# Patient Record
Sex: FEMALE | Race: WHITE | Employment: STUDENT | URBAN - METROPOLITAN AREA
[De-identification: names, ages, dates, MRNs, and addresses within clinical notes are randomized per-mention and may not be internally consistent; named-entity substitution may affect disease eponyms.]

---
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
## 2019-02-19 ENCOUNTER — OFFICE VISIT (OUTPATIENT)
Dept: FAMILY MEDICINE CLINIC | Age: 19
End: 2019-02-19
Payer: COMMERCIAL

## 2019-02-19 VITALS
DIASTOLIC BLOOD PRESSURE: 67 MMHG | WEIGHT: 194 LBS | OXYGEN SATURATION: 96 % | HEART RATE: 67 BPM | SYSTOLIC BLOOD PRESSURE: 103 MMHG | HEIGHT: 66 IN | TEMPERATURE: 98.1 F | BODY MASS INDEX: 31.18 KG/M2 | RESPIRATION RATE: 16 BRPM

## 2019-02-19 DIAGNOSIS — Z00.00 ENCOUNTER FOR WELLNESS EXAMINATION IN ADULT: Primary | ICD-10-CM

## 2019-02-19 PROCEDURE — 86580 TB INTRADERMAL TEST: CPT | Performed by: FAMILY MEDICINE

## 2019-02-19 PROCEDURE — 99395 PREV VISIT EST AGE 18-39: CPT | Performed by: PHYSICIAN ASSISTANT

## 2019-02-19 ASSESSMENT — PATIENT HEALTH QUESTIONNAIRE - PHQ9
2. FEELING DOWN, DEPRESSED OR HOPELESS: 0
SUM OF ALL RESPONSES TO PHQ QUESTIONS 1-9: 0
SUM OF ALL RESPONSES TO PHQ9 QUESTIONS 1 & 2: 0
SUM OF ALL RESPONSES TO PHQ QUESTIONS 1-9: 0
1. LITTLE INTEREST OR PLEASURE IN DOING THINGS: 0

## 2019-02-19 ASSESSMENT — VISUAL ACUITY
OS_CC: 20/13
OD_CC: 20/13

## 2019-02-20 ASSESSMENT — ENCOUNTER SYMPTOMS
CHEST TIGHTNESS: 0
SORE THROAT: 0
NAUSEA: 0
DIARRHEA: 0
COUGH: 0
ABDOMINAL PAIN: 0
FACIAL SWELLING: 0
SHORTNESS OF BREATH: 0
SINUS PRESSURE: 0
SINUS PAIN: 0
EYES NEGATIVE: 1
RHINORRHEA: 0
VOMITING: 0
CONSTIPATION: 0

## 2019-02-21 ENCOUNTER — OFFICE VISIT (OUTPATIENT)
Dept: FAMILY MEDICINE CLINIC | Age: 19
End: 2019-02-21

## 2019-02-21 DIAGNOSIS — Z11.1 ENCOUNTER FOR PPD SKIN TEST READING: Primary | ICD-10-CM

## 2019-05-08 ENCOUNTER — OFFICE VISIT (OUTPATIENT)
Dept: PRIMARY CARE CLINIC | Age: 19
End: 2019-05-08
Payer: COMMERCIAL

## 2019-05-08 ENCOUNTER — HOSPITAL ENCOUNTER (OUTPATIENT)
Age: 19
Discharge: HOME | End: 2019-05-08
Payer: COMMERCIAL

## 2019-05-08 VITALS
HEART RATE: 78 BPM | HEIGHT: 66 IN | SYSTOLIC BLOOD PRESSURE: 118 MMHG | WEIGHT: 197.4 LBS | DIASTOLIC BLOOD PRESSURE: 70 MMHG | BODY MASS INDEX: 31.72 KG/M2 | OXYGEN SATURATION: 99 %

## 2019-05-08 DIAGNOSIS — Z11.1 VISIT FOR TB SKIN TEST: ICD-10-CM

## 2019-05-08 DIAGNOSIS — Z92.0 HISTORY OF USE OF CONTRACEPTIVE INTRAUTERINE DEVICE (IUD): ICD-10-CM

## 2019-05-08 DIAGNOSIS — Z76.89 ENCOUNTER TO ESTABLISH CARE WITH NEW DOCTOR: Primary | ICD-10-CM

## 2019-05-08 DIAGNOSIS — Z28.39 INCOMPLETE IMMUNIZATION STATUS: ICD-10-CM

## 2019-05-08 DIAGNOSIS — E66.9 CLASS 1 OBESITY WITHOUT SERIOUS COMORBIDITY WITH BODY MASS INDEX (BMI) OF 31.0 TO 31.9 IN ADULT, UNSPECIFIED OBESITY TYPE: ICD-10-CM

## 2019-05-08 LAB — RUBV IGG SER QL: 108.9 IU/ML

## 2019-05-08 PROCEDURE — 99203 OFFICE O/P NEW LOW 30 MIN: CPT | Performed by: INTERNAL MEDICINE

## 2019-05-08 PROCEDURE — 86765 RUBEOLA ANTIBODY: CPT

## 2019-05-08 PROCEDURE — 86580 TB INTRADERMAL TEST: CPT | Performed by: INTERNAL MEDICINE

## 2019-05-08 PROCEDURE — 36415 COLL VENOUS BLD VENIPUNCTURE: CPT

## 2019-05-08 PROCEDURE — 86735 MUMPS ANTIBODY: CPT

## 2019-05-08 PROCEDURE — 86762 RUBELLA ANTIBODY: CPT

## 2019-05-08 ASSESSMENT — PATIENT HEALTH QUESTIONNAIRE - PHQ9
SUM OF ALL RESPONSES TO PHQ9 QUESTIONS 1 & 2: 0
SUM OF ALL RESPONSES TO PHQ QUESTIONS 1-9: 0
SUM OF ALL RESPONSES TO PHQ QUESTIONS 1-9: 0
1. LITTLE INTEREST OR PLEASURE IN DOING THINGS: 0
2. FEELING DOWN, DEPRESSED OR HOPELESS: 0

## 2019-05-10 ENCOUNTER — NURSE ONLY (OUTPATIENT)
Dept: PRIMARY CARE CLINIC | Age: 19
End: 2019-05-10

## 2019-05-10 DIAGNOSIS — Z11.1 ENCOUNTER FOR PPD SKIN TEST READING: Primary | ICD-10-CM

## 2019-05-10 LAB
MEASLES IMMUNE (IGG): 2.89
MUV IGG SER QL: 3.78

## 2019-05-10 NOTE — PROGRESS NOTES
[facility]  [address] [place] [address] Dr Piña [facility] [address]  Lisa [place] [address]  Dept: [phone]  Dept Fax: [phone]    Marylene Din is a 20 y.o. female who presents today for her medical conditions/complaints as noted below. Chief Complaint   Patient presents with    Establish Care       HPI:     This is a 26-year-old female who is here for establishing care. She has history of IUD placement. She does not have any medical problems and does not take any medications at this time except for an prescription toothpaste. She needs titers for MMR and vaccination is. Health maintenance up-to-date. No results found for: LABA1C          ( goal A1C is < 7)   No results found for: LABMICR  No results found for: LDLCHOLESTEROL, LDLCALC    (goal LDL is <100)   No results found for: AST, ALT, BUN  BP Readings from Last 3 Encounters:   05/08/19 118/70   02/19/19 103/67   07/23/18 104/66          (goal 120/80)    History reviewed. No pertinent past medical history. Past Surgical History:   Procedure Laterality Date    TONSILLECTOMY      WISDOM TOOTH EXTRACTION         Family History   Problem Relation Age of Onset    High Blood Pressure Father     Coronary Art Dis Paternal Grandmother     Ovarian Cancer Paternal Grandmother        Social History     Tobacco Use    Smoking status: Never Smoker    Smokeless tobacco: Never Used   Substance Use Topics    Alcohol use: No      Current Outpatient Medications   Medication Sig Dispense Refill    PREVIDENT 5000 ENAMEL PROTECT 1.1-5 % PSTE        No current facility-administered medications for this visit.       No Known Allergies    Health Maintenance   Topic Date Due    HPV vaccine (1 - Female 3-dose series) 04/30/2014    DTaP/Tdap/Td vaccine (2 - Tdap) 07/26/2016    Chlamydia screen  07/23/2019    Flu vaccine  Completed    HIV screen  Completed    Meningococcal (ACWY) Vaccine  Aged Out    Pneumococcal 0-64 years Vaccine  Aged Out    Varicella Vaccine  Discontinued       Subjective:      Review of Systems   Constitutional: Negative for activity change, appetite change, chills, fatigue and fever. HENT: Negative for congestion, ear pain, hearing loss, nosebleeds, sinus pressure, sinus pain and sneezing. Eyes: Negative for visual disturbance. Respiratory: Negative for cough, shortness of breath and wheezing. Cardiovascular: Negative for chest pain and palpitations. Gastrointestinal: Negative for abdominal distention, abdominal pain, constipation, diarrhea, nausea and vomiting. Endocrine: Negative for cold intolerance, heat intolerance, polydipsia, polyphagia and polyuria. Genitourinary: Negative for difficulty urinating, menstrual problem, vaginal bleeding and vaginal discharge. Musculoskeletal: Negative for back pain and joint swelling. Skin: Negative for rash. Neurological: Negative for numbness and headaches. Psychiatric/Behavioral: Negative for sleep disturbance. The patient is not nervous/anxious. All other systems reviewed and are negative. Objective:     Physical Exam   Constitutional: She is oriented to person, place, and time. Vital signs are normal. She appears well-developed and well-nourished. She is active. No distress. HENT:   Head: Normocephalic and atraumatic. Right Ear: Hearing normal.   Left Ear: Hearing normal.   Mouth/Throat: Uvula is midline, oropharynx is clear and moist and mucous membranes are normal.   Eyes: Pupils are equal, round, and reactive to light. Conjunctivae are normal. No scleral icterus. Neck: Normal range of motion, full passive range of motion without pain and phonation normal. Neck supple. No JVD present. No thyroid mass and no thyromegaly present. Cardiovascular: Normal rate, regular rhythm, normal heart sounds and intact distal pulses. Exam reveals no decreased pulses. No murmur heard.   Pulses:       Carotid pulses are 2+ on the right side, and 2+ on the left side. Radial pulses are 2+ on the right side, and 2+ on the left side. Pulmonary/Chest: Effort normal and breath sounds normal. No accessory muscle usage. No apnea. No respiratory distress. She has no wheezes. She has no rales. Abdominal: Soft. Bowel sounds are normal. She exhibits no distension. There is no tenderness. Musculoskeletal: Normal range of motion. She exhibits no edema or deformity. Lymphadenopathy:     She has no cervical adenopathy. Neurological: She is alert and oriented to person, place, and time. She displays normal reflexes. Skin: Skin is warm and intact. Capillary refill takes less than 2 seconds. No rash noted. She is not diaphoretic. Psychiatric: She has a normal mood and affect. Her behavior is normal. Cognition and memory are normal.   Nursing note and vitals reviewed. /70   Pulse 78   Ht 5' 6\" (1.676 m)   Wt 197 lb 6.4 oz (89.5 kg)   SpO2 99%   BMI 31.86 kg/m²     Assessment:          1. Encounter to establish care with new doctor      2. Visit for TB skin test    - Mantoux testing    3. Incomplete immunization status    - Rubeola Antibody, IgG; Future  - Mumps Antibody, IgG; Future  - Rubella antibody, IgG; Future    4. History of use of contraceptive intrauterine device (IUD)      5. Class 1 obesity without serious comorbidity with body mass index (BMI) of 31.0 to 31.9 in adult, unspecified obesity type  Diet and exercise advised            Diagnosis Orders   1. Encounter to establish care with new doctor     2. Visit for TB skin test  Mantoux testing   3. Incomplete immunization status  Rubeola Antibody, IgG    Mumps Antibody, IgG    Rubella antibody, IgG   4. History of use of contraceptive intrauterine device (IUD)     5.  Class 1 obesity without serious comorbidity with body mass index (BMI) of 31.0 to 31.9 in adult, unspecified obesity type             Plan:      Return in about 6 months (around 11/8/2019) for Routine follow-up. Orders Placed This Encounter   Procedures    Mantoux testing    Rubeola Antibody, IgG     Standing Status:   Future     Number of Occurrences:   1     Standing Expiration Date:   5/7/2020    Mumps Antibody, IgG     Standing Status:   Future     Number of Occurrences:   1     Standing Expiration Date:   5/7/2020    Rubella antibody, IgG     Standing Status:   Future     Number of Occurrences:   1     Standing Expiration Date:   5/7/2020     No orders of the defined types were placed in this encounter. Patient given educational materials - see patient instructions. Discussed use, benefit, and side effects of prescribedmedications. All patient questions answered. Pt voiced understanding. Reviewed health maintenance. Instructed to continue current medications, diet and exercise. Patient agreed with treatment plan. Follow up as directed. Of the given duration appointment visit, Dr. Hayder Clements MD  spent at least 50% of the face-to-face time in counseling, explanation of diagnosis, planning of further management, and answering all questions. Electronically signed by Hayder Clements MD on 5/10/2019 at 5:14 PM      Please note that this chart was generated using voice recognition Dragon dictation software. Although every effort was made to ensure the accuracy of this automatedtranscription, some errors in transcription may have occurred.

## 2019-05-12 ASSESSMENT — ENCOUNTER SYMPTOMS
WHEEZING: 0
SINUS PAIN: 0
ABDOMINAL DISTENTION: 0
SHORTNESS OF BREATH: 0
SINUS PRESSURE: 0
ABDOMINAL PAIN: 0
COUGH: 0
BACK PAIN: 0
CONSTIPATION: 0
VOMITING: 0
NAUSEA: 0
DIARRHEA: 0

## 2019-05-14 ENCOUNTER — NURSE ONLY (OUTPATIENT)
Dept: PRIMARY CARE CLINIC | Age: 19
End: 2019-05-14

## 2019-05-14 DIAGNOSIS — Z23 NEED FOR TUBERCULOSIS VACCINATION: Primary | ICD-10-CM

## 2019-05-16 ENCOUNTER — NURSE ONLY (OUTPATIENT)
Dept: PRIMARY CARE CLINIC | Age: 19
End: 2019-05-16

## 2019-05-16 DIAGNOSIS — Z11.1 ENCOUNTER FOR PPD SKIN TEST READING: Primary | ICD-10-CM

## 2019-05-20 ENCOUNTER — TELEPHONE (OUTPATIENT)
Dept: PRIMARY CARE CLINIC | Age: 19
End: 2019-05-20

## 2019-05-20 DIAGNOSIS — Z92.29 HEPATITIS B VACCINE ADMINISTERED AT BIRTH: Primary | ICD-10-CM

## 2019-05-20 NOTE — TELEPHONE ENCOUNTER
Lab orders in and spoke to the patient. She understands. She will be getting the blood work today.   He got the vaccination at birth and her work warrants her to have proof for immunity against hep B

## 2019-05-20 NOTE — TELEPHONE ENCOUNTER
Patient states the wrong titers were ordered, she need Hep B titers. She would like to have drawn today, [place] and needs done ASAP. She will have done at [facility] so advised patient orders will be in epic.

## 2019-05-21 ENCOUNTER — HOSPITAL ENCOUNTER (OUTPATIENT)
Age: 19
Discharge: HOME | End: 2019-05-21
Payer: COMMERCIAL

## 2019-05-21 DIAGNOSIS — Z92.29 HEPATITIS B VACCINE ADMINISTERED AT BIRTH: ICD-10-CM

## 2019-05-21 LAB — HBV SURFACE AB TITR SER: <3.5 MIU/ML

## 2019-05-21 PROCEDURE — 36415 COLL VENOUS BLD VENIPUNCTURE: CPT

## 2019-05-21 PROCEDURE — 86317 IMMUNOASSAY INFECTIOUS AGENT: CPT

## 2019-06-05 ENCOUNTER — NURSE ONLY (OUTPATIENT)
Dept: PRIMARY CARE CLINIC | Age: 19
End: 2019-06-05
Payer: COMMERCIAL

## 2019-06-05 DIAGNOSIS — Z23 NEED FOR DTP, HIB CONJUGATE AND HEPATITIS B VACCINE: Primary | ICD-10-CM

## 2019-06-05 PROCEDURE — 90471 IMMUNIZATION ADMIN: CPT | Performed by: INTERNAL MEDICINE

## 2019-06-05 PROCEDURE — 90746 HEPB VACCINE 3 DOSE ADULT IM: CPT | Performed by: INTERNAL MEDICINE

## 2019-08-09 ENCOUNTER — TELEPHONE (OUTPATIENT)
Dept: PRIMARY CARE CLINIC | Age: 19
End: 2019-08-09

## 2019-08-09 NOTE — TELEPHONE ENCOUNTER
Patient called in asking for a copy of her immunization record to be mailed to her home. This has been mailed.

## 2019-08-26 ENCOUNTER — NURSE ONLY (OUTPATIENT)
Dept: PRIMARY CARE CLINIC | Age: 19
End: 2019-08-26
Payer: COMMERCIAL

## 2019-08-26 DIAGNOSIS — Z23 NEED FOR HEPATITIS B VACCINATION: Primary | ICD-10-CM

## 2019-08-26 DIAGNOSIS — Z23 NEED FOR HEPATITIS B BOOSTER VACCINATION: ICD-10-CM

## 2019-08-26 PROCEDURE — 90471 IMMUNIZATION ADMIN: CPT | Performed by: INTERNAL MEDICINE

## 2019-08-26 PROCEDURE — 90746 HEPB VACCINE 3 DOSE ADULT IM: CPT | Performed by: INTERNAL MEDICINE

## 2019-08-26 NOTE — PROGRESS NOTES
After obtaining consent, and per orders of Dr. Luc Wood, injection of Hep B given in right deltoid by Sebastian Canas. Patient instructed to remain in clinic for 20 minutes afterwards, and to report any adverse reaction to me immediately.   Patient tolerated well and felt fine at departure

## 2019-11-11 ENCOUNTER — TELEPHONE (OUTPATIENT)
Dept: PRIMARY CARE CLINIC | Age: 19
End: 2019-11-11

## 2019-11-11 DIAGNOSIS — Z23 NEED FOR HEPATITIS B VACCINATION: Primary | ICD-10-CM

## 2019-12-16 ENCOUNTER — NURSE ONLY (OUTPATIENT)
Dept: PRIMARY CARE CLINIC | Age: 19
End: 2019-12-16
Payer: COMMERCIAL

## 2019-12-16 DIAGNOSIS — Z23 NEED FOR HEPATITIS B VACCINATION: Primary | ICD-10-CM

## 2019-12-16 PROCEDURE — 90471 IMMUNIZATION ADMIN: CPT | Performed by: INTERNAL MEDICINE

## 2019-12-16 PROCEDURE — 90746 HEPB VACCINE 3 DOSE ADULT IM: CPT | Performed by: INTERNAL MEDICINE

## 2020-03-02 ENCOUNTER — HOSPITAL ENCOUNTER (OUTPATIENT)
Age: 20
Discharge: HOME | End: 2020-03-02
Payer: COMMERCIAL

## 2020-03-02 LAB — HBV SURFACE AB TITR SER: >1000 MIU/ML

## 2020-03-02 PROCEDURE — 86317 IMMUNOASSAY INFECTIOUS AGENT: CPT

## 2020-03-02 PROCEDURE — 36415 COLL VENOUS BLD VENIPUNCTURE: CPT
